# Patient Record
Sex: MALE | URBAN - METROPOLITAN AREA
[De-identification: names, ages, dates, MRNs, and addresses within clinical notes are randomized per-mention and may not be internally consistent; named-entity substitution may affect disease eponyms.]

---

## 2019-12-04 ENCOUNTER — OFFICE VISIT (OUTPATIENT)
Dept: URBAN - METROPOLITAN AREA CLINIC 32 | Facility: CLINIC | Age: 24
End: 2019-12-04
Payer: COMMERCIAL

## 2019-12-04 DIAGNOSIS — H10.211 ACUTE CHEMICAL CONJUNCTIVITIS OF RIGHT EYE: Primary | ICD-10-CM

## 2019-12-04 PROCEDURE — 92002 INTRM OPH EXAM NEW PATIENT: CPT | Performed by: OPHTHALMOLOGY

## 2019-12-04 RX ORDER — ERYTHROMYCIN 5 MG/G
OINTMENT OPHTHALMIC
Qty: 3 | Refills: 1 | Status: ACTIVE
Start: 2019-12-04

## 2019-12-04 ASSESSMENT — INTRAOCULAR PRESSURE
OD: 14
OS: 14

## 2019-12-04 NOTE — IMPRESSION/PLAN
Impression: Acute chemical conjunctivitis of right eye: H10.211.  Plan: Chemical splash at work OD -- pt's with mild pain -- couple PEEs on exam OD -- start erythromycin ointment tid x5 days -- rest of exam benign -- return prn